# Patient Record
Sex: FEMALE | Race: NATIVE HAWAIIAN OR OTHER PACIFIC ISLANDER
[De-identification: names, ages, dates, MRNs, and addresses within clinical notes are randomized per-mention and may not be internally consistent; named-entity substitution may affect disease eponyms.]

---

## 2017-06-20 ENCOUNTER — HOSPITAL ENCOUNTER (INPATIENT)
Dept: HOSPITAL 12 - ER | Age: 72
LOS: 4 days | Discharge: HOME | DRG: 871 | End: 2017-06-24
Attending: INTERNAL MEDICINE | Admitting: INTERNAL MEDICINE
Payer: COMMERCIAL

## 2017-06-20 VITALS — WEIGHT: 119.38 LBS | BODY MASS INDEX: 22.54 KG/M2 | HEIGHT: 61 IN

## 2017-06-20 VITALS — DIASTOLIC BLOOD PRESSURE: 58 MMHG | SYSTOLIC BLOOD PRESSURE: 103 MMHG

## 2017-06-20 DIAGNOSIS — Z86.14: ICD-10-CM

## 2017-06-20 DIAGNOSIS — E87.5: ICD-10-CM

## 2017-06-20 DIAGNOSIS — B96.20: ICD-10-CM

## 2017-06-20 DIAGNOSIS — E11.9: ICD-10-CM

## 2017-06-20 DIAGNOSIS — R74.0: ICD-10-CM

## 2017-06-20 DIAGNOSIS — E03.9: ICD-10-CM

## 2017-06-20 DIAGNOSIS — I10: ICD-10-CM

## 2017-06-20 DIAGNOSIS — A41.9: Primary | ICD-10-CM

## 2017-06-20 DIAGNOSIS — R53.1: ICD-10-CM

## 2017-06-20 DIAGNOSIS — M48.56XA: ICD-10-CM

## 2017-06-20 DIAGNOSIS — D50.9: ICD-10-CM

## 2017-06-20 DIAGNOSIS — N17.0: ICD-10-CM

## 2017-06-20 DIAGNOSIS — E86.0: ICD-10-CM

## 2017-06-20 DIAGNOSIS — D72.819: ICD-10-CM

## 2017-06-20 DIAGNOSIS — A08.4: ICD-10-CM

## 2017-06-20 DIAGNOSIS — E78.5: ICD-10-CM

## 2017-06-20 LAB
ALP SERPL-CCNC: 61 U/L (ref 50–136)
ALT SERPL W/O P-5'-P-CCNC: 116 U/L (ref 14–59)
APPEARANCE UR: CLEAR
AST SERPL-CCNC: 143 U/L (ref 15–37)
BASOPHILS # BLD AUTO: 0 K/UL (ref 0–8)
BASOPHILS NFR BLD AUTO: 0.1 % (ref 0–2)
BILIRUB DIRECT SERPL-MCNC: 0.1 MG/DL (ref 0–0.2)
BILIRUB SERPL-MCNC: 0.3 MG/DL (ref 0.2–1)
BILIRUB UR QL STRIP: NEGATIVE
BUN SERPL-MCNC: 43 MG/DL (ref 7–18)
CHLORIDE SERPL-SCNC: 99 MMOL/L (ref 98–107)
CO2 SERPL-SCNC: 20 MMOL/L (ref 21–32)
COLOR UR: (no result)
CREAT SERPL-MCNC: 1.8 MG/DL (ref 0.6–1.3)
DEPRECATED SQUAMOUS URNS QL MICRO: (no result) /HPF
EOSINOPHIL # BLD AUTO: 0.2 K/UL (ref 0–0.7)
EOSINOPHIL NFR BLD AUTO: 12.1 % (ref 0–7)
EOSINOPHIL NFR BLD MANUAL: 11 % (ref 0–8)
GLUCOSE SERPL-MCNC: 111 MG/DL (ref 74–106)
GLUCOSE UR STRIP-MCNC: NEGATIVE MG/DL
HCT VFR BLD AUTO: 37.2 % (ref 37–47)
HGB BLD-MCNC: 11.9 G/DL (ref 12–16)
HGB UR QL STRIP: NEGATIVE
KETONES UR STRIP-MCNC: NEGATIVE MG/DL
LEUKOCYTE ESTERASE UR QL STRIP: NEGATIVE
LYMPHOCYTES # BLD AUTO: 0.9 K/UL (ref 0.8–4.8)
LYMPHOCYTES NFR BLD AUTO: 42.7 % (ref 20.5–51.5)
LYMPHOCYTES NFR BLD MANUAL: 47 % (ref 20–40)
MCH RBC QN AUTO: 25 UUG (ref 27–31)
MCHC RBC AUTO-ENTMCNC: 32 G/DL (ref 32–37)
MCV RBC AUTO: 78.1 FL (ref 81–99)
MONOCYTES # BLD AUTO: 0.3 K/UL (ref 0.1–1.3)
MONOCYTES NFR BLD AUTO: 15.7 % (ref 0–11)
MONOCYTES NFR BLD MANUAL: 19 % (ref 2–10)
NEUTROPHILS # BLD AUTO: 0.5 K/UL (ref 1.8–8.9)
NEUTROPHILS NFR BLD AUTO: 29.4 % (ref 38.5–71.5)
NEUTS BAND NFR BLD MANUAL: 11 % (ref 0–10)
NEUTS SEG NFR BLD MANUAL: 12 % (ref 42–75)
NITRITE UR QL STRIP: NEGATIVE
PH UR STRIP: 5.5 [PH] (ref 5–8)
PLATELET # BLD AUTO: 163 K/UL (ref 150–450)
POTASSIUM SERPL-SCNC: 5.4 MMOL/L (ref 3.5–5.1)
PROT UR QL STRIP: NEGATIVE
RBC # BLD AUTO: 4.76 MIL/UL (ref 4.2–5.4)
SP GR UR STRIP: 1 (ref 1–1.03)
UROBILINOGEN UR STRIP-MCNC: 0.2 E.U./DL
WBC # BLD AUTO: 2 K/UL (ref 4–11.2)
WBC #/AREA URNS HPF: (no result) /HPF (ref 0–3)
WS STN SPEC: 7.9 G/DL (ref 6.4–8.2)

## 2017-06-20 PROCEDURE — C9113 INJ PANTOPRAZOLE SODIUM, VIA: HCPCS

## 2017-06-20 RX ADMIN — Medication PRN MG: at 23:37

## 2017-06-20 NOTE — NUR
PT WAS BROUGHT TO FLOOR VIA GURNEY. ADMITTED TO TELE UNDER DR. HALLMAN. INITIATED ADMISSION 
ASSESSMENTS. WILL  CALL FOR ORDERS. BELONGING LISTS REVIEWED.

## 2017-06-20 NOTE — NUR
Patient was seen by md for c/o weakness and body aches.  IV placed by other RN. 
 Placed on cardiac monitor.

## 2017-06-21 VITALS — SYSTOLIC BLOOD PRESSURE: 105 MMHG | DIASTOLIC BLOOD PRESSURE: 72 MMHG

## 2017-06-21 VITALS — DIASTOLIC BLOOD PRESSURE: 67 MMHG | SYSTOLIC BLOOD PRESSURE: 108 MMHG

## 2017-06-21 VITALS — SYSTOLIC BLOOD PRESSURE: 95 MMHG | DIASTOLIC BLOOD PRESSURE: 54 MMHG

## 2017-06-21 VITALS — DIASTOLIC BLOOD PRESSURE: 66 MMHG | SYSTOLIC BLOOD PRESSURE: 106 MMHG

## 2017-06-21 VITALS — SYSTOLIC BLOOD PRESSURE: 95 MMHG | DIASTOLIC BLOOD PRESSURE: 63 MMHG

## 2017-06-21 LAB
ALP SERPL-CCNC: 49 U/L (ref 50–136)
ALT SERPL W/O P-5'-P-CCNC: 102 U/L (ref 14–59)
AST SERPL-CCNC: 106 U/L (ref 15–37)
BILIRUB SERPL-MCNC: 0.4 MG/DL (ref 0.2–1)
BUN SERPL-MCNC: 29 MG/DL (ref 7–18)
CHLORIDE SERPL-SCNC: 107 MMOL/L (ref 98–107)
CHOLEST SERPL-MCNC: 117 MG/DL (ref ?–200)
CO2 SERPL-SCNC: 19 MMOL/L (ref 21–32)
CREAT SERPL-MCNC: 1.2 MG/DL (ref 0.6–1.3)
EOSINOPHIL NFR BLD MANUAL: 10 % (ref 0–8)
GLUCOSE SERPL-MCNC: 84 MG/DL (ref 74–106)
HCT VFR BLD AUTO: 32.2 % (ref 37–47)
HDLC SERPL-MCNC: 33 MG/DL (ref 40–60)
HGB BLD-MCNC: 10.5 G/DL (ref 12–16)
IRON SERPL-MCNC: 109 UG/DL (ref 50–175)
LYMPHOCYTES NFR BLD MANUAL: 57 % (ref 20–40)
MAGNESIUM SERPL-MCNC: 1.7 MG/DL (ref 1.8–2.4)
MCH RBC QN AUTO: 25.3 UUG (ref 27–31)
MCHC RBC AUTO-ENTMCNC: 33 G/DL (ref 32–37)
MCV RBC AUTO: 77.9 FL (ref 81–99)
MONOCYTES NFR BLD MANUAL: 18 % (ref 2–10)
NEUTS BAND NFR BLD MANUAL: 6 % (ref 0–10)
NEUTS SEG NFR BLD MANUAL: 9 % (ref 42–75)
PHOSPHATE SERPL-MCNC: 2.8 MG/DL (ref 2.5–4.9)
PLATELET # BLD AUTO: 142 K/UL (ref 150–450)
POTASSIUM SERPL-SCNC: 5.2 MMOL/L (ref 3.5–5.1)
RBC # BLD AUTO: 4.13 MIL/UL (ref 4.2–5.4)
TRIGL SERPL-MCNC: 250 MG/DL (ref 30–150)
TSH SERPL DL<=0.005 MIU/L-ACNC: 1.04 MIU/ML (ref 0.36–3.74)
WBC # BLD AUTO: 2.2 K/UL (ref 4–11.2)
WS STN SPEC: 6.1 G/DL (ref 6.4–8.2)

## 2017-06-21 RX ADMIN — TAZOBACTAM SODIUM AND PIPERACILLIN SODIUM SCH MLS/HR: 250; 2 INJECTION, SOLUTION INTRAVENOUS at 00:58

## 2017-06-21 RX ADMIN — TAZOBACTAM SODIUM AND PIPERACILLIN SODIUM SCH MLS/HR: 250; 2 INJECTION, SOLUTION INTRAVENOUS at 23:37

## 2017-06-21 RX ADMIN — Medication PRN MG: at 00:16

## 2017-06-21 RX ADMIN — Medication PRN MG: at 08:45

## 2017-06-21 RX ADMIN — SODIUM CHLORIDE PRN MLS/HR: 0.9 INJECTION, SOLUTION INTRAVENOUS at 00:16

## 2017-06-21 RX ADMIN — TAZOBACTAM SODIUM AND PIPERACILLIN SODIUM SCH MLS/HR: 250; 2 INJECTION, SOLUTION INTRAVENOUS at 18:47

## 2017-06-21 RX ADMIN — Medication PRN MG: at 20:37

## 2017-06-21 RX ADMIN — DEXTROSE SCH MLS/HR: 50 INJECTION, SOLUTION INTRAVENOUS at 11:18

## 2017-06-21 RX ADMIN — DEXTROSE SCH MG: 50 INJECTION, SOLUTION INTRAVENOUS at 08:41

## 2017-06-21 RX ADMIN — SODIUM CHLORIDE PRN MG: 9 INJECTION, SOLUTION INTRAVENOUS at 17:59

## 2017-06-21 RX ADMIN — LEVOTHYROXINE SODIUM SCH MCG: 75 TABLET ORAL at 08:39

## 2017-06-21 RX ADMIN — TAZOBACTAM SODIUM AND PIPERACILLIN SODIUM SCH MLS/HR: 250; 2 INJECTION, SOLUTION INTRAVENOUS at 12:46

## 2017-06-21 RX ADMIN — MAGNESIUM SULFATE IN DEXTROSE SCH MLS/HR: 10 INJECTION, SOLUTION INTRAVENOUS at 19:50

## 2017-06-21 RX ADMIN — Medication PRN MG: at 18:46

## 2017-06-21 RX ADMIN — TAZOBACTAM SODIUM AND PIPERACILLIN SODIUM SCH MLS/HR: 250; 2 INJECTION, SOLUTION INTRAVENOUS at 06:10

## 2017-06-21 RX ADMIN — SODIUM CHLORIDE PRN MG: 9 INJECTION, SOLUTION INTRAVENOUS at 22:28

## 2017-06-21 RX ADMIN — MAGNESIUM SULFATE IN DEXTROSE SCH MLS/HR: 10 INJECTION, SOLUTION INTRAVENOUS at 17:47

## 2017-06-21 RX ADMIN — SODIUM CHLORIDE PRN MLS/HR: 0.9 INJECTION, SOLUTION INTRAVENOUS at 19:51

## 2017-06-21 NOTE — NUR
[facility]SA SWAB TAKEN AND SENT TO LAB. C/O GEN PAIN, WAS GIVEN TRAMADOL AS PRESCRIBED, AND WAS 
HELPFUL. ALSO GIVEN AMBIEN LAST AS REQUESTED  AND AS PRESCRIBED, PT SLEPT AFTERWARDS. ON 
IVF. STILL INFUSING. SAFETY MAINTAINED. CALL LIGHT WITHIN REACH.

## 2017-06-21 NOTE — NUR
Awake & alert, no SOB noted denies chest pain. No nausea/vomiting  complaint. IVF infusing  
well. Assisted to the bathroom. No further change noted.

## 2017-06-21 NOTE — NUR
END OF SHIFT NOTE: PATIENT IN NO ACUTE DISTRESS THROUGHOUT SHIT. VSS. PATIENT HAD X1 EPISODE 
OF VOMITING AFTER SOFT DIET. MEDICATED AS PRESCRIBED WITH GOOD EFFECT. PATIENT MEDICATED FOR 
PAIN WITH GOOD RESULTS. IVF INFUSING. NEEDS MET BY STAFF AND CALL LIGHT AT REACH.

## 2017-06-21 NOTE — NUR
Clinical Pharmacy Note - Vancomycin Pharmacy to Dose



Subjective: 

To start vancomycin in this 72 y/o female for suspected infection (general body 
malaise/pain, nausea, suspected MRSA exposure/infxn)



Objective:

height 154cm

weight 54kg



BUN 29  Scr 1.2   Wbc 2.2   Temp 97.6

1gm given in ER at 0051 6/21



Assessment/Plan:

Will start vanco 750mg q26hr, first dose today at 1100 for expected trough of 15.55. Will 
order trough before 4th scheduled dose (not ordered yet). Will also check renal function 
daily and readjust as appropriate if needed. Will continue to monitor

## 2017-06-22 VITALS — DIASTOLIC BLOOD PRESSURE: 82 MMHG | SYSTOLIC BLOOD PRESSURE: 129 MMHG

## 2017-06-22 VITALS — SYSTOLIC BLOOD PRESSURE: 115 MMHG | DIASTOLIC BLOOD PRESSURE: 73 MMHG

## 2017-06-22 VITALS — DIASTOLIC BLOOD PRESSURE: 80 MMHG | SYSTOLIC BLOOD PRESSURE: 113 MMHG

## 2017-06-22 VITALS — SYSTOLIC BLOOD PRESSURE: 124 MMHG | DIASTOLIC BLOOD PRESSURE: 86 MMHG

## 2017-06-22 LAB
BUN SERPL-MCNC: 15 MG/DL (ref 7–18)
CHLORIDE SERPL-SCNC: 104 MMOL/L (ref 98–107)
CO2 SERPL-SCNC: 23 MMOL/L (ref 21–32)
CREAT SERPL-MCNC: 1 MG/DL (ref 0.6–1.3)
GLUCOSE SERPL-MCNC: 121 MG/DL (ref 74–106)
HCT VFR BLD AUTO: 33.8 % (ref 37–47)
HGB BLD-MCNC: 11.1 G/DL (ref 12–16)
LYMPHOCYTES NFR BLD MANUAL: 58 % (ref 20–40)
MAGNESIUM SERPL-MCNC: 2 MG/DL (ref 1.8–2.4)
MCH RBC QN AUTO: 25.6 UUG (ref 27–31)
MCHC RBC AUTO-ENTMCNC: 33 G/DL (ref 32–37)
MCV RBC AUTO: 77.6 FL (ref 81–99)
MONOCYTES NFR BLD MANUAL: 23 % (ref 2–10)
NEUTS BAND NFR BLD MANUAL: 4 % (ref 0–10)
NEUTS SEG NFR BLD MANUAL: 15 % (ref 42–75)
PHOSPHATE SERPL-MCNC: 2.4 MG/DL (ref 2.5–4.9)
PLATELET # BLD AUTO: 167 K/UL (ref 150–450)
POTASSIUM SERPL-SCNC: 4.5 MMOL/L (ref 3.5–5.1)
RBC # BLD AUTO: 4.35 MIL/UL (ref 4.2–5.4)
WBC # BLD AUTO: 2.2 K/UL (ref 4–11.2)

## 2017-06-22 RX ADMIN — SODIUM CHLORIDE PRN MG: 9 INJECTION, SOLUTION INTRAVENOUS at 08:20

## 2017-06-22 RX ADMIN — LEVOTHYROXINE SODIUM SCH MCG: 75 TABLET ORAL at 05:44

## 2017-06-22 RX ADMIN — SODIUM CHLORIDE PRN MG: 9 INJECTION, SOLUTION INTRAVENOUS at 03:45

## 2017-06-22 RX ADMIN — Medication PRN MG: at 23:57

## 2017-06-22 RX ADMIN — SODIUM CHLORIDE PRN MLS/HR: 0.9 INJECTION, SOLUTION INTRAVENOUS at 12:14

## 2017-06-22 RX ADMIN — SODIUM CHLORIDE PRN MG: 9 INJECTION, SOLUTION INTRAVENOUS at 23:57

## 2017-06-22 RX ADMIN — DEXTROSE SCH MG: 50 INJECTION, SOLUTION INTRAVENOUS at 05:44

## 2017-06-22 RX ADMIN — SODIUM CHLORIDE PRN MG: 9 INJECTION, SOLUTION INTRAVENOUS at 17:36

## 2017-06-22 RX ADMIN — SODIUM CHLORIDE PRN MG: 9 INJECTION, SOLUTION INTRAVENOUS at 12:15

## 2017-06-22 RX ADMIN — DEXTROSE SCH MLS/HR: 50 INJECTION, SOLUTION INTRAVENOUS at 13:00

## 2017-06-22 RX ADMIN — Medication SCH EACH: at 06:02

## 2017-06-22 RX ADMIN — TAZOBACTAM SODIUM AND PIPERACILLIN SODIUM SCH MLS/HR: 250; 2 INJECTION, SOLUTION INTRAVENOUS at 05:44

## 2017-06-22 RX ADMIN — TAZOBACTAM SODIUM AND PIPERACILLIN SODIUM SCH MLS/HR: 250; 2 INJECTION, SOLUTION INTRAVENOUS at 12:22

## 2017-06-22 NOTE — NUR
Clinical Pharmacy Note - Vancomycin Pharmacy to Dose



Subjective: 

To continue vancomycin in this 72 y/o female for possible sepsis (general body malaise/pain, 
nausea, suspected MRSA exposure/infxn), R/O abdominal infection( on Zosyn also).



Objective:

height 154cm

weight 54kg



BUN 15  Scr 1.0   Wbc 2.2   Temp 97.5

1gm given in ER at 0051 6/21



Assessment/Plan:

Will continue vanco 750mg q26hr, second dose today at 1300 for expected trough of 15.55. 
Will order trough before 4th scheduled dose (not ordered yet). Will also check renal 
function daily and readjust as appropriate if needed. Will continue to monitor

## 2017-06-22 NOTE — NUR
PATIENT RECEIVED IN ROOM WALKING TO THE BATHROOM NO S/S OF DISTRESS OBSERVED. DENIED N/V/D 
AT THIS TIME. IVF RUNNING.

## 2017-06-22 NOTE — NUR
END OF SHIT NOTE: PATIENT IN NO ACUTE DISTRESS THROUGHOUT SHIFT. DENIED PAIN. VSS. MEDICATED 
FOR N/V AS PRESCRIBED. IVF RUNNING. INDEPENDENT WITH ADLs AND TOILETING. HAD 4 LOOSE STOOLS 
AND X3 EPISODES OF VOMITING THROUGHOUT THE DAY. NEEDS MET BY STAFF. CONTINUES IN CONTACT 
ISOLATION.

## 2017-06-23 VITALS — SYSTOLIC BLOOD PRESSURE: 119 MMHG | DIASTOLIC BLOOD PRESSURE: 67 MMHG

## 2017-06-23 VITALS — DIASTOLIC BLOOD PRESSURE: 89 MMHG | SYSTOLIC BLOOD PRESSURE: 135 MMHG

## 2017-06-23 VITALS — DIASTOLIC BLOOD PRESSURE: 75 MMHG | SYSTOLIC BLOOD PRESSURE: 131 MMHG

## 2017-06-23 VITALS — DIASTOLIC BLOOD PRESSURE: 85 MMHG | SYSTOLIC BLOOD PRESSURE: 145 MMHG

## 2017-06-23 LAB
BASOPHILS # BLD AUTO: 0 K/UL (ref 0–8)
BASOPHILS NFR BLD AUTO: 0.3 % (ref 0–2)
BASOPHILS NFR BLD MANUAL: 1 % (ref 0–2)
BUN SERPL-MCNC: 9 MG/DL (ref 7–18)
CHLORIDE SERPL-SCNC: 105 MMOL/L (ref 98–107)
CO2 SERPL-SCNC: 23 MMOL/L (ref 21–32)
CREAT SERPL-MCNC: 0.8 MG/DL (ref 0.6–1.3)
EOSINOPHIL # BLD AUTO: 0.1 K/UL (ref 0–0.7)
EOSINOPHIL NFR BLD AUTO: 1.7 % (ref 0–7)
EOSINOPHIL NFR BLD MANUAL: 3 % (ref 0–8)
GLUCOSE SERPL-MCNC: 108 MG/DL (ref 74–106)
HCT VFR BLD AUTO: 34.2 % (ref 37–47)
HGB BLD-MCNC: 11.2 G/DL (ref 12–16)
LYMPHOCYTES # BLD AUTO: 2 K/UL (ref 0.8–4.8)
LYMPHOCYTES NFR BLD AUTO: 60.2 % (ref 20.5–51.5)
LYMPHOCYTES NFR BLD MANUAL: 45 % (ref 20–40)
MAGNESIUM SERPL-MCNC: 1.7 MG/DL (ref 1.8–2.4)
MCH RBC QN AUTO: 25.7 UUG (ref 27–31)
MCHC RBC AUTO-ENTMCNC: 33 G/DL (ref 32–37)
MCV RBC AUTO: 78.2 FL (ref 81–99)
MONOCYTES # BLD AUTO: 0.6 K/UL (ref 0.1–1.3)
MONOCYTES NFR BLD AUTO: 19.8 % (ref 0–11)
MONOCYTES NFR BLD MANUAL: 26 % (ref 2–10)
NEUTROPHILS # BLD AUTO: 0.6 K/UL (ref 1.8–8.9)
NEUTROPHILS NFR BLD AUTO: 18 % (ref 38.5–71.5)
NEUTS SEG NFR BLD MANUAL: 15 % (ref 42–75)
PHOSPHATE SERPL-MCNC: 2 MG/DL (ref 2.5–4.9)
PLATELET # BLD AUTO: 186 K/UL (ref 150–450)
POTASSIUM SERPL-SCNC: 4 MMOL/L (ref 3.5–5.1)
RBC # BLD AUTO: 4.37 MIL/UL (ref 4.2–5.4)
VARIANT LYMPHS NFR BLD MANUAL: 10 % (ref 0–0)
WBC # BLD AUTO: 3.3 K/UL (ref 4–11.2)

## 2017-06-23 RX ADMIN — Medication SCH EACH: at 06:43

## 2017-06-23 RX ADMIN — SODIUM CHLORIDE PRN MG: 9 INJECTION, SOLUTION INTRAVENOUS at 16:30

## 2017-06-23 RX ADMIN — SODIUM CHLORIDE PRN MLS/HR: 0.9 INJECTION, SOLUTION INTRAVENOUS at 05:02

## 2017-06-23 RX ADMIN — LEVOTHYROXINE SODIUM SCH MCG: 75 TABLET ORAL at 06:43

## 2017-06-23 RX ADMIN — SODIUM CHLORIDE PRN MG: 9 INJECTION, SOLUTION INTRAVENOUS at 20:20

## 2017-06-23 RX ADMIN — DEXTROSE SCH MG: 50 INJECTION, SOLUTION INTRAVENOUS at 06:42

## 2017-06-23 RX ADMIN — Medication PRN MG: at 21:21

## 2017-06-23 NOTE — NUR
Bedside reporting with ALL Mendo[facility]. Patient on bed, awake, not in distress. Denies any 
pain/discomforts at this time. Boyfriend at bedside. Safety measure and fall precaution 
maintain. Continue care as planned.

## 2017-06-23 NOTE — NUR
PATIENT AMBULATED AROUND THE UNIT WITH LUAN RADIOLOGIST, STEADY GAIT. 250 CC OF EMESIS 
AFTER EATING PRUNE YOGURT, DIET WAS CHANGED BACK TO CLEAR LIQUIDS. DR. HALLMAN WAS NOTIFIED. IV 
FLUIDS STILL RUNNING. SAFETY AND COMFORT PROVIDED BY STAFF. WILL CONTINUE MONITORING

## 2017-06-23 NOTE — NUR
PATIENT BEEN IN BED RESTING, SLEEPING INTERMITTENTLY. NO S/S OF DISTRESS NOTED. 2 DIARRHEA 
EPISODES WERE REPORTED. PATIENTS IS ALERT AND ORIENTED #4, BUT SEEMS WEAK. DIET WAS ADVANCED 
TO FULL LIQUIDS, WAS ABLE TO TOLERATED CLEAR LIQUIDS, NO NAUSEA OR VOMITING REPORTED. SAFETY 
AND COMFORT PROVIDED BY STAFF. WILL CONTINUE MONITORING.

## 2017-06-23 NOTE — NUR
PT SLEPT AFTER GIVEN WITH AMBIEN LAST NIGHT,NO FURTHER C/O NAUSEA AFTER GIVEN  ZOFRAN. IVF 
INFUSING. STILL ON NPO. PT STATED TO HAVE 2 LOOSE BM LAST NIGHT.  IN AM. SAFETY 
MAINTAINED. CALL LIGHT WITHIN REACH.

## 2017-06-24 VITALS — DIASTOLIC BLOOD PRESSURE: 75 MMHG | SYSTOLIC BLOOD PRESSURE: 121 MMHG

## 2017-06-24 VITALS — SYSTOLIC BLOOD PRESSURE: 126 MMHG | DIASTOLIC BLOOD PRESSURE: 84 MMHG

## 2017-06-24 LAB
ALP SERPL-CCNC: 46 U/L (ref 50–136)
ALT SERPL W/O P-5'-P-CCNC: 94 U/L (ref 14–59)
AST SERPL-CCNC: 62 U/L (ref 15–37)
BASOPHILS # BLD AUTO: 0 K/UL (ref 0–8)
BASOPHILS NFR BLD AUTO: 0.2 % (ref 0–2)
BILIRUB SERPL-MCNC: 0.2 MG/DL (ref 0.2–1)
BUN SERPL-MCNC: 9 MG/DL (ref 7–18)
CHLORIDE SERPL-SCNC: 105 MMOL/L (ref 98–107)
CO2 SERPL-SCNC: 26 MMOL/L (ref 21–32)
CREAT SERPL-MCNC: 0.7 MG/DL (ref 0.6–1.3)
EOSINOPHIL # BLD AUTO: 0 K/UL (ref 0–0.7)
EOSINOPHIL NFR BLD AUTO: 0.9 % (ref 0–7)
EOSINOPHIL NFR BLD MANUAL: 1 % (ref 0–8)
GLUCOSE SERPL-MCNC: 116 MG/DL (ref 74–106)
HCT VFR BLD AUTO: 34.8 % (ref 37–47)
HGB BLD-MCNC: 11.3 G/DL (ref 12–16)
LYMPHOCYTES # BLD AUTO: 2.4 K/UL (ref 0.8–4.8)
LYMPHOCYTES NFR BLD AUTO: 55.1 % (ref 20.5–51.5)
LYMPHOCYTES NFR BLD MANUAL: 46 % (ref 20–40)
MAGNESIUM SERPL-MCNC: 1.9 MG/DL (ref 1.8–2.4)
MCH RBC QN AUTO: 25.4 UUG (ref 27–31)
MCHC RBC AUTO-ENTMCNC: 32 G/DL (ref 32–37)
MCV RBC AUTO: 78.4 FL (ref 81–99)
MONOCYTES # BLD AUTO: 0.9 K/UL (ref 0.1–1.3)
MONOCYTES NFR BLD AUTO: 19.5 % (ref 0–11)
MONOCYTES NFR BLD MANUAL: 21 % (ref 2–10)
NEUTROPHILS # BLD AUTO: 1.1 K/UL (ref 1.8–8.9)
NEUTROPHILS NFR BLD AUTO: 24.3 % (ref 38.5–71.5)
NEUTS SEG NFR BLD MANUAL: 32 % (ref 42–75)
PHOSPHATE SERPL-MCNC: 1.8 MG/DL (ref 2.5–4.9)
PLATELET # BLD AUTO: 216 K/UL (ref 150–450)
POTASSIUM SERPL-SCNC: 3.5 MMOL/L (ref 3.5–5.1)
RBC # BLD AUTO: 4.44 MIL/UL (ref 4.2–5.4)
WBC # BLD AUTO: 4.4 K/UL (ref 4–11.2)
WS STN SPEC: 6.8 G/DL (ref 6.4–8.2)

## 2017-06-24 RX ADMIN — SODIUM CHLORIDE SCH MLS/HR: 9 INJECTION, SOLUTION INTRAVENOUS at 12:07

## 2017-06-24 RX ADMIN — LEVOTHYROXINE SODIUM SCH MCG: 75 TABLET ORAL at 06:20

## 2017-06-24 RX ADMIN — SODIUM CHLORIDE SCH MLS/HR: 9 INJECTION, SOLUTION INTRAVENOUS at 14:15

## 2017-06-24 RX ADMIN — SODIUM CHLORIDE PRN MG: 9 INJECTION, SOLUTION INTRAVENOUS at 12:15

## 2017-06-24 RX ADMIN — Medication SCH EACH: at 06:20

## 2017-06-24 RX ADMIN — SODIUM CHLORIDE PRN MG: 9 INJECTION, SOLUTION INTRAVENOUS at 06:28

## 2017-06-24 RX ADMIN — SODIUM CHLORIDE PRN MLS/HR: 0.9 INJECTION, SOLUTION INTRAVENOUS at 10:08

## 2017-06-24 NOTE — NUR
PATIENT BEEN DISCHARGE HOME. NO S/S OF DISTRESS NOTED. DENIED PAIN DURING MY SHIFT. C/O OF 
NAUSEA, MEDICATED AS ORDERED. NO VOMITING OR DIARRHEA. REFUSED ;AST DOSE OF POTASSIUM. 
DISCHARGE INSTRUCTIONS WERE EXPLAINED, SIGNED.  BELONGINGS WERE TAKEN AS WELLS AS 
PRESCRIPTIONS AND DR'S NOTES. PATIENT WAS TAKEN IN WHEELCHAIR BY STUDENTS IN SAFE 
CONDITIONS. SAFETY AND COMFORT PROVIDED BY STAFF.

## 2017-06-24 NOTE — NUR
Had a shower today. Vomited once throughout the shift. Medicated twice with Zofran for c/o 
nausea and vomiting. Continue to monitor. Will  endorse to oncoming shift. VSS

## 2017-09-15 ENCOUNTER — HOSPITAL ENCOUNTER (OUTPATIENT)
Dept: HOSPITAL 12 - LAB | Age: 72
Discharge: HOME | End: 2017-09-15
Payer: COMMERCIAL

## 2017-09-15 DIAGNOSIS — Z00.01: Primary | ICD-10-CM

## 2017-09-15 DIAGNOSIS — E11.9: ICD-10-CM

## 2017-09-15 DIAGNOSIS — E78.00: ICD-10-CM

## 2017-09-15 DIAGNOSIS — E03.9: ICD-10-CM

## 2017-09-15 DIAGNOSIS — D64.9: ICD-10-CM

## 2017-09-15 DIAGNOSIS — I10: ICD-10-CM

## 2017-09-15 LAB
ALP SERPL-CCNC: 43 U/L (ref 50–136)
ALT SERPL W/O P-5'-P-CCNC: 22 U/L (ref 14–59)
APPEARANCE UR: CLEAR
AST SERPL-CCNC: 22 U/L (ref 15–37)
BASOPHILS # BLD AUTO: 0 K/UL (ref 0–8)
BASOPHILS NFR BLD AUTO: 0.7 % (ref 0–2)
BILIRUB SERPL-MCNC: 0.4 MG/DL (ref 0.2–1)
BILIRUB UR QL STRIP: NEGATIVE
BUN SERPL-MCNC: 28 MG/DL (ref 7–18)
CHLORIDE SERPL-SCNC: 103 MMOL/L (ref 98–107)
CHOLEST SERPL-MCNC: 161 MG/DL (ref ?–200)
CO2 SERPL-SCNC: 26 MMOL/L (ref 21–32)
COLOR UR: YELLOW
CREAT SERPL-MCNC: 1.3 MG/DL (ref 0.6–1.3)
DEPRECATED SQUAMOUS URNS QL MICRO: (no result) /HPF
EOSINOPHIL # BLD AUTO: 0.2 K/UL (ref 0–0.7)
EOSINOPHIL NFR BLD AUTO: 3.7 % (ref 0–7)
GLUCOSE SERPL-MCNC: 119 MG/DL (ref 74–106)
GLUCOSE UR STRIP-MCNC: NEGATIVE MG/DL
HCT VFR BLD AUTO: 37.1 % (ref 37–47)
HDLC SERPL-MCNC: 69 MG/DL (ref 40–60)
HGB BLD-MCNC: 12.6 G/DL (ref 12–16)
HGB UR QL STRIP: NEGATIVE
IRON SERPL-MCNC: 70 UG/DL (ref 50–175)
KETONES UR STRIP-MCNC: NEGATIVE MG/DL
LEUKOCYTE ESTERASE UR QL STRIP: NEGATIVE
LYMPHOCYTES # BLD AUTO: 1.5 K/UL (ref 0.8–4.8)
LYMPHOCYTES NFR BLD AUTO: 25.3 % (ref 20.5–51.5)
MCH RBC QN AUTO: 26.5 UUG (ref 27–31)
MCHC RBC AUTO-ENTMCNC: 34 G/DL (ref 32–37)
MCV RBC AUTO: 78.3 FL (ref 81–99)
MONOCYTES # BLD AUTO: 0.3 K/UL (ref 0.1–1.3)
MONOCYTES NFR BLD AUTO: 5.1 % (ref 0–11)
NEUTROPHILS # BLD AUTO: 3.8 K/UL (ref 1.8–8.9)
NEUTROPHILS NFR BLD AUTO: 65.2 % (ref 38.5–71.5)
NITRITE UR QL STRIP: NEGATIVE
PH UR STRIP: 5.5 [PH] (ref 5–8)
PLATELET # BLD AUTO: 221 K/UL (ref 150–450)
POTASSIUM SERPL-SCNC: 4.7 MMOL/L (ref 3.5–5.1)
PROT UR QL STRIP: NEGATIVE
RBC # BLD AUTO: 4.74 MIL/UL (ref 4.2–5.4)
RBC #/AREA URNS HPF: (no result) /HPF (ref 0–3)
SP GR UR STRIP: 1.01 (ref 1–1.03)
TRIGL SERPL-MCNC: 103 MG/DL (ref 30–150)
TSH SERPL DL<=0.005 MIU/L-ACNC: 0.17 MIU/ML (ref 0.36–3.74)
UROBILINOGEN UR STRIP-MCNC: 0.2 E.U./DL
WBC # BLD AUTO: 5.8 K/UL (ref 4–11.2)
WBC #/AREA URNS HPF: (no result) /HPF
WBC #/AREA URNS HPF: (no result) /HPF (ref 0–3)
WS STN SPEC: 8 G/DL (ref 6.4–8.2)

## 2017-10-06 ENCOUNTER — HOSPITAL ENCOUNTER (EMERGENCY)
Dept: HOSPITAL 12 - ER | Age: 72
Discharge: HOME | End: 2017-10-06
Payer: COMMERCIAL

## 2017-10-06 VITALS — BODY MASS INDEX: 22.66 KG/M2 | WEIGHT: 120 LBS | HEIGHT: 61 IN

## 2017-10-06 DIAGNOSIS — E78.00: ICD-10-CM

## 2017-10-06 DIAGNOSIS — A08.4: Primary | ICD-10-CM

## 2017-10-06 DIAGNOSIS — I10: ICD-10-CM

## 2017-10-06 DIAGNOSIS — E11.9: ICD-10-CM

## 2017-10-06 LAB
ALP SERPL-CCNC: 41 U/L (ref 50–136)
ALT SERPL W/O P-5'-P-CCNC: 21 U/L (ref 14–59)
APPEARANCE UR: CLEAR
AST SERPL-CCNC: 17 U/L (ref 15–37)
BASOPHILS # BLD AUTO: 0 K/UL (ref 0–8)
BASOPHILS NFR BLD AUTO: 0.1 % (ref 0–2)
BILIRUB DIRECT SERPL-MCNC: 0.1 MG/DL (ref 0–0.2)
BILIRUB SERPL-MCNC: 0.5 MG/DL (ref 0.2–1)
BILIRUB UR QL STRIP: NEGATIVE
BUN SERPL-MCNC: 35 MG/DL (ref 7–18)
CHLORIDE SERPL-SCNC: 103 MMOL/L (ref 98–107)
CO2 SERPL-SCNC: 24 MMOL/L (ref 21–32)
COLOR UR: YELLOW
CREAT SERPL-MCNC: 1.5 MG/DL (ref 0.6–1.3)
DEPRECATED SQUAMOUS URNS QL MICRO: (no result) /HPF
EOSINOPHIL # BLD AUTO: 0 K/UL (ref 0–0.7)
EOSINOPHIL NFR BLD AUTO: 0.3 % (ref 0–7)
GLUCOSE SERPL-MCNC: 132 MG/DL (ref 74–106)
GLUCOSE UR STRIP-MCNC: NEGATIVE MG/DL
HCT VFR BLD AUTO: 37.5 % (ref 31.2–41.9)
HGB BLD-MCNC: 12.7 G/DL (ref 10.9–14.3)
HGB UR QL STRIP: NEGATIVE
KETONES UR STRIP-MCNC: NEGATIVE MG/DL
LEUKOCYTE ESTERASE UR QL STRIP: NEGATIVE
LIPASE SERPL-CCNC: 291 U/L (ref 73–393)
LYMPHOCYTES # BLD AUTO: 0.4 K/UL (ref 20–40)
LYMPHOCYTES NFR BLD AUTO: 4.1 % (ref 20.5–51.5)
MCH RBC QN AUTO: 27 UUG (ref 24.7–32.8)
MCHC RBC AUTO-ENTMCNC: 34 G/DL (ref 32.3–35.6)
MCV RBC AUTO: 79.9 FL (ref 75.5–95.3)
MONOCYTES # BLD AUTO: 0.2 K/UL (ref 2–10)
MONOCYTES NFR BLD AUTO: 1.9 % (ref 0–11)
MUCOUS THREADS URNS QL MICRO: (no result) /LPF
NEUTROPHILS # BLD AUTO: 8.5 K/UL (ref 1.8–8.9)
NEUTROPHILS NFR BLD AUTO: 93.6 % (ref 38.5–71.5)
NITRITE UR QL STRIP: NEGATIVE
PH UR STRIP: 5 [PH] (ref 5–8)
PLATELET # BLD AUTO: 173 K/UL (ref 179–408)
POTASSIUM SERPL-SCNC: 4.5 MMOL/L (ref 3.5–5.1)
PROT UR QL STRIP: NEGATIVE
RBC # BLD AUTO: 4.7 MIL/UL (ref 3.63–4.92)
RBC #/AREA URNS HPF: (no result) /HPF (ref 0–3)
SP GR UR STRIP: 1.01 (ref 1–1.03)
UROBILINOGEN UR STRIP-MCNC: 0.2 E.U./DL
WBC # BLD AUTO: 9.1 K/UL (ref 3.8–11.8)
WBC #/AREA URNS HPF: (no result) /HPF
WBC #/AREA URNS HPF: (no result) /HPF (ref 0–3)
WS STN SPEC: 7.4 G/DL (ref 6.4–8.2)

## 2017-10-06 PROCEDURE — A4663 DIALYSIS BLOOD PRESSURE CUFF: HCPCS

## 2017-10-06 NOTE — NUR
No vomiting seen while in ER.  IV removed.  Catheter intact and site benign. 
Pressure and 4x4 gauze applied to site. No bleeding noted.  Patient discharged 
to home in stable conditon.  Written and verbal after care instructions given 
to patient. Patient verbalizes understanding of instructions. Copies of all lab 
tests results are provided to patient per request.

## 2017-10-06 NOTE — NUR
Patient is resting comfortably in bed with eyes closed, calm, NAD, no acute 
change in condition seen

## 2018-08-30 ENCOUNTER — HOSPITAL ENCOUNTER (OUTPATIENT)
Dept: HOSPITAL 12 - LAB | Age: 73
Discharge: HOME | End: 2018-08-30
Payer: COMMERCIAL

## 2018-08-30 DIAGNOSIS — Z00.01: Primary | ICD-10-CM

## 2018-08-30 DIAGNOSIS — I10: ICD-10-CM

## 2018-08-30 DIAGNOSIS — D64.9: ICD-10-CM

## 2018-08-30 DIAGNOSIS — E11.9: ICD-10-CM

## 2018-08-30 DIAGNOSIS — E03.9: ICD-10-CM

## 2018-08-30 LAB
ALP SERPL-CCNC: 43 U/L (ref 50–136)
ALT SERPL W/O P-5'-P-CCNC: 20 U/L (ref 14–59)
APPEARANCE UR: CLEAR
AST SERPL-CCNC: 24 U/L (ref 15–37)
BASOPHILS # BLD AUTO: 0.1 K/UL (ref 0–8)
BASOPHILS NFR BLD AUTO: 1 % (ref 0–2)
BILIRUB SERPL-MCNC: 0.5 MG/DL (ref 0.2–1)
BILIRUB UR QL STRIP: NEGATIVE
BUN SERPL-MCNC: 23 MG/DL (ref 7–18)
CHLORIDE SERPL-SCNC: 105 MMOL/L (ref 98–107)
CHOLEST SERPL-MCNC: 179 MG/DL (ref ?–200)
CO2 SERPL-SCNC: 25 MMOL/L (ref 21–32)
COLOR UR: (no result)
CREAT SERPL-MCNC: 1.3 MG/DL (ref 0.6–1.3)
DEPRECATED SQUAMOUS URNS QL MICRO: (no result) /HPF
EOSINOPHIL # BLD AUTO: 0.3 K/UL (ref 0–0.7)
EOSINOPHIL NFR BLD AUTO: 6.1 % (ref 0–7)
GLUCOSE SERPL-MCNC: 113 MG/DL (ref 74–106)
GLUCOSE UR STRIP-MCNC: NEGATIVE MG/DL
HCT VFR BLD AUTO: 37.3 % (ref 31.2–41.9)
HDLC SERPL-MCNC: 64 MG/DL (ref 40–60)
HGB BLD-MCNC: 12.2 G/DL (ref 10.9–14.3)
HGB UR QL STRIP: NEGATIVE
IRON SERPL-MCNC: 64 UG/DL (ref 50–175)
KETONES UR STRIP-MCNC: NEGATIVE MG/DL
LEUKOCYTE ESTERASE UR QL STRIP: NEGATIVE
LYMPHOCYTES # BLD AUTO: 1.5 K/UL (ref 20–40)
LYMPHOCYTES NFR BLD AUTO: 28.9 % (ref 20.5–51.5)
MCH RBC QN AUTO: 26.6 UUG (ref 24.7–32.8)
MCHC RBC AUTO-ENTMCNC: 33 G/DL (ref 32.3–35.6)
MCV RBC AUTO: 81.1 FL (ref 75.5–95.3)
MONOCYTES # BLD AUTO: 0.3 K/UL (ref 2–10)
MONOCYTES NFR BLD AUTO: 6 % (ref 0–11)
NEUTROPHILS # BLD AUTO: 2.9 K/UL (ref 1.8–8.9)
NEUTROPHILS NFR BLD AUTO: 58 % (ref 38.5–71.5)
NITRITE UR QL STRIP: NEGATIVE
PH UR STRIP: 7 [PH] (ref 5–8)
PLATELET # BLD AUTO: 212 K/UL (ref 179–408)
POTASSIUM SERPL-SCNC: 4.2 MMOL/L (ref 3.5–5.1)
PROT UR QL STRIP: (no result)
RBC # BLD AUTO: 4.6 MIL/UL (ref 3.63–4.92)
RBC #/AREA URNS HPF: (no result) /HPF (ref 0–3)
SP GR UR STRIP: 1.02 (ref 1–1.03)
TRIGL SERPL-MCNC: 124 MG/DL (ref 30–150)
TSH SERPL DL<=0.005 MIU/L-ACNC: 0.55 MIU/ML (ref 0.36–3.74)
URATE SERPL-MCNC: 4.8 MG/DL (ref 2.6–6)
UROBILINOGEN UR STRIP-MCNC: 0.2 E.U./DL
WBC # BLD AUTO: 5.1 K/UL (ref 3.8–11.8)
WBC #/AREA URNS HPF: (no result) /HPF
WBC #/AREA URNS HPF: (no result) /HPF (ref 0–3)
WS STN SPEC: 7.7 G/DL (ref 6.4–8.2)

## 2018-08-31 LAB
T3FREE SERPL-MCNC: 2.4 PG/ML (ref 2–4.4)
VIT D, 25-HYDROXY: 28.8 NG/ML (ref 30–100)

## 2019-01-16 ENCOUNTER — HOSPITAL ENCOUNTER (OUTPATIENT)
Dept: HOSPITAL 12 - RAD | Age: 74
Discharge: HOME | End: 2019-01-16
Attending: INTERNAL MEDICINE
Payer: COMMERCIAL

## 2019-01-16 DIAGNOSIS — R11.0: ICD-10-CM

## 2019-01-16 DIAGNOSIS — R10.9: ICD-10-CM

## 2019-01-16 DIAGNOSIS — K21.9: Primary | ICD-10-CM

## 2019-01-30 ENCOUNTER — HOSPITAL ENCOUNTER (OUTPATIENT)
Dept: HOSPITAL 12 - LAB | Age: 74
Discharge: HOME | End: 2019-01-30
Attending: INTERNAL MEDICINE
Payer: COMMERCIAL

## 2019-01-30 DIAGNOSIS — R97.8: ICD-10-CM

## 2019-01-30 DIAGNOSIS — K21.0: Primary | ICD-10-CM

## 2019-01-30 DIAGNOSIS — R79.1: ICD-10-CM

## 2019-01-30 LAB
ALP SERPL-CCNC: 61 U/L (ref 50–136)
ALT SERPL W/O P-5'-P-CCNC: 14 U/L (ref 14–59)
AMYLASE SERPL-CCNC: 34 U/L (ref 25–115)
AST SERPL-CCNC: 17 U/L (ref 15–37)
BASOPHILS # BLD AUTO: 0 K/UL (ref 0–8)
BASOPHILS NFR BLD AUTO: 0.6 % (ref 0–2)
BILIRUB SERPL-MCNC: 0.4 MG/DL (ref 0.2–1)
BUN SERPL-MCNC: 22 MG/DL (ref 7–18)
CHLORIDE SERPL-SCNC: 103 MMOL/L (ref 98–107)
CHOLEST SERPL-MCNC: 163 MG/DL (ref ?–200)
CO2 SERPL-SCNC: 28 MMOL/L (ref 21–32)
CREAT SERPL-MCNC: 1.4 MG/DL (ref 0.6–1.3)
EOSINOPHIL # BLD AUTO: 0.2 K/UL (ref 0–0.7)
EOSINOPHIL NFR BLD AUTO: 4 % (ref 0–7)
GLUCOSE SERPL-MCNC: 114 MG/DL (ref 74–106)
HCT VFR BLD AUTO: 37.1 % (ref 31.2–41.9)
HDLC SERPL-MCNC: 66 MG/DL (ref 40–60)
HGB BLD-MCNC: 12 G/DL (ref 10.9–14.3)
LIPASE SERPL-CCNC: 161 U/L (ref 73–393)
LYMPHOCYTES # BLD AUTO: 1.6 K/UL (ref 20–40)
LYMPHOCYTES NFR BLD AUTO: 26.8 % (ref 20.5–51.5)
MCH RBC QN AUTO: 25.3 UUG (ref 24.7–32.8)
MCHC RBC AUTO-ENTMCNC: 32 G/DL (ref 32.3–35.6)
MCV RBC AUTO: 78.5 FL (ref 75.5–95.3)
MONOCYTES # BLD AUTO: 0.4 K/UL (ref 2–10)
MONOCYTES NFR BLD AUTO: 6 % (ref 0–11)
NEUTROPHILS # BLD AUTO: 3.9 K/UL (ref 1.8–8.9)
NEUTROPHILS NFR BLD AUTO: 62.6 % (ref 38.5–71.5)
PLATELET # BLD AUTO: 247 K/UL (ref 179–408)
POTASSIUM SERPL-SCNC: 4.6 MMOL/L (ref 3.5–5.1)
RBC # BLD AUTO: 4.73 MIL/UL (ref 3.63–4.92)
TRIGL SERPL-MCNC: 107 MG/DL (ref 30–150)
TSH SERPL DL<=0.005 MIU/L-ACNC: 1.3 MIU/ML (ref 0.36–3.74)
WBC # BLD AUTO: 6.2 K/UL (ref 3.8–11.8)
WS STN SPEC: 8.1 G/DL (ref 6.4–8.2)

## 2019-02-05 ENCOUNTER — HOSPITAL ENCOUNTER (OUTPATIENT)
Dept: HOSPITAL 12 - DS | Age: 74
Discharge: HOME | End: 2019-02-05
Attending: INTERNAL MEDICINE
Payer: COMMERCIAL

## 2019-02-05 DIAGNOSIS — E07.9: ICD-10-CM

## 2019-02-05 DIAGNOSIS — Z88.2: ICD-10-CM

## 2019-02-05 DIAGNOSIS — Z98.890: ICD-10-CM

## 2019-02-05 DIAGNOSIS — E78.00: ICD-10-CM

## 2019-02-05 DIAGNOSIS — Z82.49: ICD-10-CM

## 2019-02-05 DIAGNOSIS — Z79.899: ICD-10-CM

## 2019-02-05 DIAGNOSIS — Z83.3: ICD-10-CM

## 2019-02-05 DIAGNOSIS — M19.90: ICD-10-CM

## 2019-02-05 DIAGNOSIS — I10: ICD-10-CM

## 2019-02-05 DIAGNOSIS — Z79.84: ICD-10-CM

## 2019-02-05 DIAGNOSIS — E11.9: ICD-10-CM

## 2019-02-05 DIAGNOSIS — Z88.8: ICD-10-CM

## 2019-02-05 DIAGNOSIS — K29.70: Primary | ICD-10-CM

## 2019-02-05 DIAGNOSIS — Z85.850: ICD-10-CM

## 2019-02-05 LAB
APPEARANCE UR: CLEAR
BILIRUB UR QL STRIP: NEGATIVE
COLOR UR: YELLOW
DEPRECATED SQUAMOUS URNS QL MICRO: (no result) /HPF
GLUCOSE UR STRIP-MCNC: NEGATIVE MG/DL
HGB UR QL STRIP: NEGATIVE
KETONES UR STRIP-MCNC: NEGATIVE MG/DL
LEUKOCYTE ESTERASE UR QL STRIP: (no result)
NITRITE UR QL STRIP: POSITIVE
PH UR STRIP: 6.5 [PH] (ref 5–8)
RBC #/AREA URNS HPF: (no result) /HPF (ref 0–3)
SP GR UR STRIP: 1.02 (ref 1–1.03)
UROBILINOGEN UR STRIP-MCNC: 0.2 E.U./DL
WBC #/AREA URNS HPF: (no result) /HPF
WBC #/AREA URNS HPF: (no result) /HPF (ref 0–3)

## 2019-02-05 PROCEDURE — 88305 TISSUE EXAM BY PATHOLOGIST: CPT

## 2019-02-05 PROCEDURE — 88342 IMHCHEM/IMCYTCHM 1ST ANTB: CPT

## 2019-02-05 PROCEDURE — 93005 ELECTROCARDIOGRAM TRACING: CPT

## 2019-02-05 PROCEDURE — 88312 SPECIAL STAINS GROUP 1: CPT

## 2019-02-05 PROCEDURE — 71045 X-RAY EXAM CHEST 1 VIEW: CPT

## 2019-02-05 PROCEDURE — A4217 STERILE WATER/SALINE, 500 ML: HCPCS

## 2019-02-05 PROCEDURE — 81001 URINALYSIS AUTO W/SCOPE: CPT

## 2019-02-05 PROCEDURE — 82962 GLUCOSE BLOOD TEST: CPT

## 2019-02-05 PROCEDURE — A4663 DIALYSIS BLOOD PRESSURE CUFF: HCPCS

## 2019-02-05 PROCEDURE — 43239 EGD BIOPSY SINGLE/MULTIPLE: CPT

## 2019-04-10 ENCOUNTER — HOSPITAL ENCOUNTER (OUTPATIENT)
Dept: HOSPITAL 12 - RAD | Age: 74
Discharge: HOME | End: 2019-04-10
Attending: INTERNAL MEDICINE
Payer: COMMERCIAL

## 2019-04-10 DIAGNOSIS — K21.0: Primary | ICD-10-CM

## 2019-04-10 DIAGNOSIS — R97.8: ICD-10-CM

## 2019-04-10 DIAGNOSIS — K30: ICD-10-CM

## 2019-04-10 DIAGNOSIS — R79.1: ICD-10-CM

## 2019-04-10 PROCEDURE — 78264 GASTRIC EMPTYING IMG STUDY: CPT

## 2019-06-05 ENCOUNTER — HOSPITAL ENCOUNTER (OUTPATIENT)
Dept: HOSPITAL 12 - LAB | Age: 74
Discharge: HOME | End: 2019-06-05
Payer: COMMERCIAL

## 2019-06-05 DIAGNOSIS — E11.9: ICD-10-CM

## 2019-06-05 DIAGNOSIS — J32.9: ICD-10-CM

## 2019-06-05 DIAGNOSIS — E78.00: ICD-10-CM

## 2019-06-05 DIAGNOSIS — E03.9: ICD-10-CM

## 2019-06-05 DIAGNOSIS — E55.9: ICD-10-CM

## 2019-06-05 DIAGNOSIS — D64.9: ICD-10-CM

## 2019-06-05 DIAGNOSIS — Z00.00: Primary | ICD-10-CM

## 2019-06-05 DIAGNOSIS — I10: ICD-10-CM

## 2019-06-05 LAB
ALP SERPL-CCNC: 64 U/L (ref 50–136)
ALT SERPL W/O P-5'-P-CCNC: 20 U/L (ref 14–59)
APPEARANCE UR: (no result)
AST SERPL-CCNC: 16 U/L (ref 15–37)
BASOPHILS # BLD AUTO: 0 K/UL (ref 0–8)
BASOPHILS NFR BLD AUTO: 0.8 % (ref 0–2)
BILIRUB SERPL-MCNC: 0.5 MG/DL (ref 0.2–1)
BILIRUB UR QL STRIP: NEGATIVE
BUN SERPL-MCNC: 31 MG/DL (ref 7–18)
CHLORIDE SERPL-SCNC: 104 MMOL/L (ref 98–107)
CHOLEST SERPL-MCNC: 183 MG/DL (ref ?–200)
CO2 SERPL-SCNC: 29 MMOL/L (ref 21–32)
COLOR UR: YELLOW
CREAT SERPL-MCNC: 1.6 MG/DL (ref 0.6–1.3)
DEPRECATED SQUAMOUS URNS QL MICRO: (no result) /HPF
EOSINOPHIL # BLD AUTO: 0.2 K/UL (ref 0–0.7)
EOSINOPHIL NFR BLD AUTO: 4.6 % (ref 0–7)
GLUCOSE SERPL-MCNC: 136 MG/DL (ref 74–106)
GLUCOSE UR STRIP-MCNC: NEGATIVE MG/DL
HCT VFR BLD AUTO: 36 % (ref 31.2–41.9)
HDLC SERPL-MCNC: 60 MG/DL (ref 40–60)
HGB BLD-MCNC: 11.6 G/DL (ref 10.9–14.3)
HGB UR QL STRIP: NEGATIVE
IRON SERPL-MCNC: 86 UG/DL (ref 50–175)
KETONES UR STRIP-MCNC: NEGATIVE MG/DL
LEUKOCYTE ESTERASE UR QL STRIP: (no result)
LYMPHOCYTES # BLD AUTO: 1.4 K/UL (ref 20–40)
LYMPHOCYTES NFR BLD AUTO: 28.1 % (ref 20.5–51.5)
MCH RBC QN AUTO: 25.6 UUG (ref 24.7–32.8)
MCHC RBC AUTO-ENTMCNC: 32 G/DL (ref 32.3–35.6)
MCV RBC AUTO: 79.5 FL (ref 75.5–95.3)
MONOCYTES # BLD AUTO: 0.3 K/UL (ref 2–10)
MONOCYTES NFR BLD AUTO: 6 % (ref 0–11)
NEUTROPHILS # BLD AUTO: 3.1 K/UL (ref 1.8–8.9)
NEUTROPHILS NFR BLD AUTO: 60.5 % (ref 38.5–71.5)
NITRITE UR QL STRIP: POSITIVE
PH UR STRIP: 6.5 [PH] (ref 5–8)
PLATELET # BLD AUTO: 207 K/UL (ref 179–408)
POTASSIUM SERPL-SCNC: 4.6 MMOL/L (ref 3.5–5.1)
RBC # BLD AUTO: 4.52 MIL/UL (ref 3.63–4.92)
RBC #/AREA URNS HPF: (no result) /HPF (ref 0–3)
SP GR UR STRIP: 1.01 (ref 1–1.03)
TRIGL SERPL-MCNC: 100 MG/DL (ref 30–150)
TSH SERPL DL<=0.005 MIU/L-ACNC: 1.16 MIU/ML (ref 0.36–3.74)
URATE SERPL-MCNC: 5.7 MG/DL (ref 2.6–6)
UROBILINOGEN UR STRIP-MCNC: 0.2 E.U./DL
WBC # BLD AUTO: 5.1 K/UL (ref 3.8–11.8)
WBC #/AREA URNS HPF: (no result) /HPF
WBC #/AREA URNS HPF: (no result) /HPF (ref 0–3)
WS STN SPEC: 8 G/DL (ref 6.4–8.2)

## 2019-06-08 LAB
25(OH)D2 SERPL-MCNC: <1 NG/ML
25(OH)D3 SERPL-MCNC: 29 NG/ML
25(OH)D3+25(OH)D2 SERPL-MCNC: 30 NG/ML

## 2019-08-28 ENCOUNTER — HOSPITAL ENCOUNTER (OUTPATIENT)
Dept: HOSPITAL 12 - LAB | Age: 74
Discharge: HOME | End: 2019-08-28
Payer: COMMERCIAL

## 2019-08-28 DIAGNOSIS — E11.9: ICD-10-CM

## 2019-08-28 DIAGNOSIS — D64.9: Primary | ICD-10-CM

## 2019-08-28 DIAGNOSIS — I10: ICD-10-CM

## 2019-08-28 DIAGNOSIS — Z90.710: ICD-10-CM

## 2019-08-28 DIAGNOSIS — E78.00: ICD-10-CM

## 2019-08-28 DIAGNOSIS — E55.9: ICD-10-CM

## 2019-08-28 DIAGNOSIS — M16.12: ICD-10-CM

## 2019-08-28 DIAGNOSIS — E03.9: ICD-10-CM

## 2019-08-28 DIAGNOSIS — M19.012: ICD-10-CM

## 2019-08-28 DIAGNOSIS — E78.5: ICD-10-CM

## 2019-08-28 LAB
ALP SERPL-CCNC: 59 U/L (ref 50–136)
ALT SERPL W/O P-5'-P-CCNC: 9 U/L (ref 14–59)
APPEARANCE UR: (no result)
AST SERPL-CCNC: 10 U/L (ref 15–37)
BASOPHILS # BLD AUTO: 0 K/UL (ref 0–8)
BASOPHILS NFR BLD AUTO: 0.6 % (ref 0–2)
BILIRUB SERPL-MCNC: 0.4 MG/DL (ref 0.2–1)
BILIRUB UR QL STRIP: NEGATIVE
BUN SERPL-MCNC: 26 MG/DL (ref 7–18)
CHLORIDE SERPL-SCNC: 103 MMOL/L (ref 98–107)
CHOLEST SERPL-MCNC: 151 MG/DL (ref ?–200)
CO2 SERPL-SCNC: 28 MMOL/L (ref 21–32)
COLOR UR: YELLOW
CREAT SERPL-MCNC: 1.3 MG/DL (ref 0.6–1.3)
DEPRECATED SQUAMOUS URNS QL MICRO: (no result) /HPF
EOSINOPHIL # BLD AUTO: 0.3 K/UL (ref 0–0.7)
EOSINOPHIL NFR BLD AUTO: 4.2 % (ref 0–7)
GLUCOSE SERPL-MCNC: 116 MG/DL (ref 74–106)
GLUCOSE UR STRIP-MCNC: NEGATIVE MG/DL
HCT VFR BLD AUTO: 36.2 % (ref 31.2–41.9)
HDLC SERPL-MCNC: 54 MG/DL (ref 40–60)
HGB BLD-MCNC: 11.9 G/DL (ref 10.9–14.3)
HGB UR QL STRIP: NEGATIVE
IRON SERPL-MCNC: 62 UG/DL (ref 50–175)
KETONES UR STRIP-MCNC: NEGATIVE MG/DL
LEUKOCYTE ESTERASE UR QL STRIP: (no result)
LYMPHOCYTES # BLD AUTO: 1.4 K/UL (ref 20–40)
LYMPHOCYTES NFR BLD AUTO: 22.3 % (ref 20.5–51.5)
MCH RBC QN AUTO: 26 UUG (ref 24.7–32.8)
MCHC RBC AUTO-ENTMCNC: 33 G/DL (ref 32.3–35.6)
MCV RBC AUTO: 78.6 FL (ref 75.5–95.3)
MONOCYTES # BLD AUTO: 0.3 K/UL (ref 2–10)
MONOCYTES NFR BLD AUTO: 5.3 % (ref 0–11)
NEUTROPHILS # BLD AUTO: 4.2 K/UL (ref 1.8–8.9)
NEUTROPHILS NFR BLD AUTO: 67.6 % (ref 38.5–71.5)
NITRITE UR QL STRIP: POSITIVE
PH UR STRIP: 6 [PH] (ref 5–8)
PLATELET # BLD AUTO: 293 K/UL (ref 179–408)
POTASSIUM SERPL-SCNC: 4.4 MMOL/L (ref 3.5–5.1)
RBC # BLD AUTO: 4.6 MIL/UL (ref 3.63–4.92)
RBC #/AREA URNS HPF: (no result) /HPF (ref 0–3)
SP GR UR STRIP: 1.02 (ref 1–1.03)
TRIGL SERPL-MCNC: 108 MG/DL (ref 30–150)
TSH SERPL DL<=0.005 MIU/L-ACNC: 0.59 MIU/ML (ref 0.36–3.74)
UROBILINOGEN UR STRIP-MCNC: 0.2 E.U./DL
WBC # BLD AUTO: 6.1 K/UL (ref 3.8–11.8)
WBC #/AREA URNS HPF: (no result) /HPF
WBC #/AREA URNS HPF: (no result) /HPF (ref 0–3)
WS STN SPEC: 8.1 G/DL (ref 6.4–8.2)

## 2019-09-01 LAB
25(OH)D2 SERPL-MCNC: <1 NG/ML
25(OH)D3 SERPL-MCNC: 29 NG/ML
25(OH)D3+25(OH)D2 SERPL-MCNC: 29 NG/ML